# Patient Record
Sex: MALE | Race: WHITE | NOT HISPANIC OR LATINO | Employment: OTHER | URBAN - METROPOLITAN AREA
[De-identification: names, ages, dates, MRNs, and addresses within clinical notes are randomized per-mention and may not be internally consistent; named-entity substitution may affect disease eponyms.]

---

## 2017-05-23 ENCOUNTER — HOSPITAL ENCOUNTER (OUTPATIENT)
Dept: SLEEP MEDICINE | Facility: HOSPITAL | Age: 67
Discharge: HOME | End: 2017-05-23
Admitting: INTERNAL MEDICINE

## 2017-05-23 PROCEDURE — G0463 HOSPITAL OUTPT CLINIC VISIT: HCPCS

## 2017-08-29 ENCOUNTER — HOSPITAL ENCOUNTER (OUTPATIENT)
Dept: SLEEP MEDICINE | Facility: HOSPITAL | Age: 67
Discharge: HOME | End: 2017-08-29
Admitting: INTERNAL MEDICINE

## 2017-08-29 PROCEDURE — G0463 HOSPITAL OUTPT CLINIC VISIT: HCPCS

## 2017-08-29 NOTE — PROGRESS NOTES
Sleep clinic follow up note.  [facility]    Cruz Blue  66 y.o.  male  1950    PCP: Kendra Acosta MD      Date of visit: 8/29/2017    INTERM HISTORY:  This is a 66 y.o. years old patient who has a history of sleep apnea and is on CPAP.  Patient reports good compliance and has no problems.  Denies snoring, daytime excessive sleepiness.   The Smart card download has been reviewed and shows the following..  Compliance;100 %  > 4 hr use, 97 %  Residual AHI: 2.0 /hr (normal less than 5)      PAST MEDICAL HISTORY:  · Obstructive sleep apnea  · DM II  · HTN    MEDICATIONS:  Metoprolol  Xanax  Zoloft  Simvastatin    SOCIAL, FAMILY HISTORY: Medical record is reviewed and noted.    PHYSICAL EXAMINATION:  BP: 116/70  Weight: 234 lbs  BMI: 34  HEENT: Unremarkable, pupils are round equal and reacting to light   NECK: No lymphadenopathy, throat is clear, oral airway Mallampati class 3  RESPRATORY SYSTEM: Breath sounds are equal on both sides and are normal, no wheezes or crackles  CARDIOVASULAR SYSTEM: Heart rate is regular without murmur  ABDOMEN: Soft, no ascites, no hepatosplenomegaly.  EXTREMITIES: No cyanosis, clubbing or edema    ASSESSMENT AND PLAN:  · Obstructive sleep apnea, patient does has obstructive sleep apnea and using the CPAP with good compliance.  The residual AHI is acceptable.  I have discussed with the patient about the download data and encouarged the patient to continue to use the CPAP.  The device is benefiting the patient.  The patient is also instructed to get the CPAP supplies from the DME company and see me in 1 year for follow-up.  The [employer] is [employer]  · Obesity, I have discussed weight reduction and the health benefits.  I have also discuss the relationship between the weight and the sleep apnea and encouraged the patient to lose weight  · DM II  · HTN      Diogenes Luciano MD, [facility]  Pulmonary, Critical Care and sleep Medicine

## 2017-08-30 ENCOUNTER — TELEPHONE (OUTPATIENT)
Dept: SLEEP MEDICINE | Facility: HOSPITAL | Age: 67
End: 2017-08-30

## 2018-08-28 ENCOUNTER — APPOINTMENT (OUTPATIENT)
Dept: SLEEP MEDICINE | Facility: HOSPITAL | Age: 68
End: 2018-08-28
Attending: INTERNAL MEDICINE

## 2024-07-31 ENCOUNTER — TRANSCRIBE ORDERS (OUTPATIENT)
Dept: ADMINISTRATIVE | Facility: HOSPITAL | Age: 74
End: 2024-07-31
Payer: MEDICARE

## 2024-07-31 DIAGNOSIS — J98.6 PARALYSIS OF DIAPHRAGM: Primary | ICD-10-CM

## 2024-07-31 DIAGNOSIS — J98.6 ELEVATED DIAPHRAGM: Primary | ICD-10-CM

## 2024-08-15 ENCOUNTER — TRANSCRIBE ORDERS (OUTPATIENT)
Dept: SLEEP MEDICINE | Facility: HOSPITAL | Age: 74
End: 2024-08-15
Payer: MEDICARE

## 2024-08-15 DIAGNOSIS — G47.33 OSA (OBSTRUCTIVE SLEEP APNEA): Primary | ICD-10-CM
